# Patient Record
Sex: FEMALE | Race: WHITE | NOT HISPANIC OR LATINO | Employment: FULL TIME | URBAN - METROPOLITAN AREA
[De-identification: names, ages, dates, MRNs, and addresses within clinical notes are randomized per-mention and may not be internally consistent; named-entity substitution may affect disease eponyms.]

---

## 2022-12-14 ENCOUNTER — OFFICE VISIT (OUTPATIENT)
Dept: OBSTETRICS AND GYNECOLOGY | Facility: CLINIC | Age: 27
End: 2022-12-14
Payer: COMMERCIAL

## 2022-12-14 VITALS
DIASTOLIC BLOOD PRESSURE: 60 MMHG | WEIGHT: 128.31 LBS | HEIGHT: 67 IN | BODY MASS INDEX: 20.14 KG/M2 | SYSTOLIC BLOOD PRESSURE: 110 MMHG

## 2022-12-14 DIAGNOSIS — Z01.419 ENCOUNTER FOR ANNUAL ROUTINE GYNECOLOGICAL EXAMINATION: Primary | ICD-10-CM

## 2022-12-14 DIAGNOSIS — Z12.39 BREAST CANCER SCREENING OTHER THAN MAMMOGRAM: ICD-10-CM

## 2022-12-14 DIAGNOSIS — Z30.41 ENCOUNTER FOR SURVEILLANCE OF CONTRACEPTIVE PILLS: ICD-10-CM

## 2022-12-14 PROCEDURE — 99385 PR PREVENTIVE VISIT,NEW,18-39: ICD-10-PCS | Mod: S$GLB,,, | Performed by: OBSTETRICS & GYNECOLOGY

## 2022-12-14 PROCEDURE — 3078F PR MOST RECENT DIASTOLIC BLOOD PRESSURE < 80 MM HG: ICD-10-PCS | Mod: CPTII,S$GLB,, | Performed by: OBSTETRICS & GYNECOLOGY

## 2022-12-14 PROCEDURE — 3074F PR MOST RECENT SYSTOLIC BLOOD PRESSURE < 130 MM HG: ICD-10-PCS | Mod: CPTII,S$GLB,, | Performed by: OBSTETRICS & GYNECOLOGY

## 2022-12-14 PROCEDURE — 88142 CYTOPATH C/V THIN LAYER: CPT | Performed by: OBSTETRICS & GYNECOLOGY

## 2022-12-14 PROCEDURE — 3008F PR BODY MASS INDEX (BMI) DOCUMENTED: ICD-10-PCS | Mod: CPTII,S$GLB,, | Performed by: OBSTETRICS & GYNECOLOGY

## 2022-12-14 PROCEDURE — 3008F BODY MASS INDEX DOCD: CPT | Mod: CPTII,S$GLB,, | Performed by: OBSTETRICS & GYNECOLOGY

## 2022-12-14 PROCEDURE — 99385 PREV VISIT NEW AGE 18-39: CPT | Mod: S$GLB,,, | Performed by: OBSTETRICS & GYNECOLOGY

## 2022-12-14 PROCEDURE — 3078F DIAST BP <80 MM HG: CPT | Mod: CPTII,S$GLB,, | Performed by: OBSTETRICS & GYNECOLOGY

## 2022-12-14 PROCEDURE — 3074F SYST BP LT 130 MM HG: CPT | Mod: CPTII,S$GLB,, | Performed by: OBSTETRICS & GYNECOLOGY

## 2022-12-14 PROCEDURE — 1159F PR MEDICATION LIST DOCUMENTED IN MEDICAL RECORD: ICD-10-PCS | Mod: CPTII,S$GLB,, | Performed by: OBSTETRICS & GYNECOLOGY

## 2022-12-14 PROCEDURE — 1159F MED LIST DOCD IN RCRD: CPT | Mod: CPTII,S$GLB,, | Performed by: OBSTETRICS & GYNECOLOGY

## 2022-12-14 RX ORDER — LEVONORGESTREL / ETHINYL ESTRADIOL AND ETHINYL ESTRADIOL 150-30(84)
1 KIT ORAL DAILY
Qty: 84 EACH | Refills: 3 | Status: SHIPPED | OUTPATIENT
Start: 2022-12-14 | End: 2023-12-12

## 2022-12-14 NOTE — PROGRESS NOTES
"Chief Complaint: Well Woman Exam     HPI:      Mounika Raphael is a 27 y.o.  who presents today for well woman exam.  LMP: Patient's last menstrual period was 12/10/2022 (exact date).  No issues, problems, or complaints. Specifically, patient denies abnormal vaginal bleeding, discharge, pelvic pain, urinary problems, or changes in appetite. Ms. Raphael is currently sexually active with a single male partner. She is currently using condoms, oral contraceptives (estrogen/progesterone) for contraception. She declines STD screening today.    Previous Pap:      (No result found)  Previous Mammogram:  No results found for this or any previous visit.  Most Recent Dexa: not indicated  Colonoscopy: not indicated    COVID vaccine: completed series, no boosters  Gardasil:Completed     There is no problem list on file for this patient.      History reviewed. No pertinent past medical history.    History reviewed. No pertinent surgical history.    OB History          0    Para   0    Term   0       0    AB   0    Living   0         SAB   0    IAB   0    Ectopic   0    Multiple   0    Live Births   0           Obstetric Comments   15/R/4-5  No abnormal pap  No STDs               ROS:     Review of Systems   Constitutional:  Negative for activity change, appetite change and fatigue.   Respiratory:  Negative for shortness of breath.    Cardiovascular:  Negative for chest pain.   Gastrointestinal:  Negative for abdominal pain, constipation and diarrhea.   Endocrine: Negative for cold intolerance and heat intolerance.   Genitourinary:  Negative for dysuria, frequency, menstrual irregularity, pelvic pain and vaginal discharge.   Integumentary:  Negative for breast mass, breast discharge and breast tenderness.   Psychiatric/Behavioral:  Negative for dysphoric mood. The patient is not nervous/anxious.    Breast: Negative for mass and tenderness    Physical Exam:      PHYSICAL EXAM:  /60   Ht 5' 7" (1.702 " m)   Wt 58.2 kg (128 lb 4.9 oz)   LMP 12/10/2022 (Exact Date)   BMI 20.10 kg/m²   Body mass index is 20.1 kg/m².     APPEARANCE: Well nourished, well developed, in no acute distress.  PSYCH: Appropriate mood and affect.  SKIN: No acne or hirsutism  NECK: Neck symmetric without masses or thyromegaly  NODES: No inguinal, axillary, or supraclavicular lymph node enlargement  ABDOMEN: Soft.  No tenderness or masses.    CARDIOVASCULAR: No edema of peripheral extremities  BREASTS: Symmetrical, no skin changes or visible lesions.  No palpable masses or nipple discharge bilaterally.  PELVIC: Normal external genitalia without lesions.  Normal hair distribution.  Adequate perineal body, normal urethral meatus.  Vagina moist and well rugated without lesions or discharge.  Cervix pink, without lesions, discharge or tenderness.  No significant cystocele or rectocele.  Bimanual exam shows uterus to be normal size, regular, mobile and nontender.  Adnexa without masses or tenderness.      Assessment:     1. Encounter for annual routine gynecological examination  Liquid-Based Pap Smear, Screening      2. Encounter for surveillance of contraceptive pills  L norgest/e.estradioL-e.estrad (DAYSEE) 0.15 mg-30 mcg (84)/10 mcg (7) 3MPk      3. Breast cancer screening other than mammogram              Plan:     Clinical breast exam performed.  Pap collected.  Mammogram at 40 or as needed.  DEXA at 65 or as needed.  Colonoscopy at 45 or as needed.  Contraception: ASIM.  Follow up in about 1 year (around 12/14/2023) for annual well woman exam or as needed.    Counseling:     Patient was counseled today on current ASCCP pap guidelines, the recommendation for yearly pelvic exams, healthy diet and exercise routines, breast self awareness.She is to see her PCP for other health maintenance.     Use of the [employer] Patient Portal discussed and encouraged during today's visit.         Chelle Wall MD  [facility]  12/14/2022

## 2022-12-14 NOTE — PATIENT INSTRUCTIONS
Please check out the [employer] College of Obst[employer]ians [employer]nec[employer] PATIENT WEBSITE.  The site has education materials, patient stories, expert views, and a portal for you to ask questions.       [email or web address]      As always, please let me know if you have any questions.     Dr. Chelle Wall

## 2022-12-22 LAB
FINAL PATHOLOGIC DIAGNOSIS: NORMAL
Lab: NORMAL

## 2022-12-23 ENCOUNTER — TELEPHONE (OUTPATIENT)
Dept: OBSTETRICS AND GYNECOLOGY | Facility: CLINIC | Age: 27
End: 2022-12-23
Payer: COMMERCIAL

## 2023-06-21 ENCOUNTER — OFFICE VISIT (OUTPATIENT)
Dept: FAMILY MEDICINE | Facility: CLINIC | Age: 28
End: 2023-06-21
Attending: FAMILY MEDICINE
Payer: COMMERCIAL

## 2023-06-21 DIAGNOSIS — B96.89 BACTERIAL CONJUNCTIVITIS OF BOTH EYES: ICD-10-CM

## 2023-06-21 DIAGNOSIS — H10.9 BACTERIAL CONJUNCTIVITIS OF BOTH EYES: ICD-10-CM

## 2023-06-21 DIAGNOSIS — J02.0 ACUTE STREPTOCOCCAL PHARYNGITIS: Primary | ICD-10-CM

## 2023-06-21 PROCEDURE — 1160F RVW MEDS BY RX/DR IN RCRD: CPT | Mod: CPTII,95,, | Performed by: FAMILY MEDICINE

## 2023-06-21 PROCEDURE — 99204 PR OFFICE/OUTPT VISIT, NEW, LEVL IV, 45-59 MIN: ICD-10-PCS | Mod: 95,,, | Performed by: FAMILY MEDICINE

## 2023-06-21 PROCEDURE — 1159F PR MEDICATION LIST DOCUMENTED IN MEDICAL RECORD: ICD-10-PCS | Mod: CPTII,95,, | Performed by: FAMILY MEDICINE

## 2023-06-21 PROCEDURE — 99204 OFFICE O/P NEW MOD 45 MIN: CPT | Mod: 95,,, | Performed by: FAMILY MEDICINE

## 2023-06-21 PROCEDURE — 1160F PR REVIEW ALL MEDS BY PRESCRIBER/CLIN PHARMACIST DOCUMENTED: ICD-10-PCS | Mod: CPTII,95,, | Performed by: FAMILY MEDICINE

## 2023-06-21 PROCEDURE — 1159F MED LIST DOCD IN RCRD: CPT | Mod: CPTII,95,, | Performed by: FAMILY MEDICINE

## 2023-06-21 RX ORDER — AMOXICILLIN 875 MG/1
875 TABLET, FILM COATED ORAL EVERY 12 HOURS
Qty: 20 TABLET | Refills: 0 | Status: SHIPPED | OUTPATIENT
Start: 2023-06-21 | End: 2024-01-02 | Stop reason: ALTCHOICE

## 2023-06-21 NOTE — PROGRESS NOTES
Subjective     Patient ID: Mounika Raphael is a 28 y.o. female.    Chief Complaint: Sore Throat and Conjunctivitis    The patient location is: [place]    The chief complaint leading to consultation is: pink eye/strep throat    Visit type: audiovisual    Face to Face time with patient: 15 min  25 minutes of total time spent on the encounter, which includes face to face time and non-face to face time preparing to see the patient (eg, review of tests), Obtaining and/or reviewing separately obtained history, Documenting clinical information in the electronic or other health record, Independently interpreting results (not separately reported) and communicating results to the patient/family/caregiver, or Care coordination (not separately reported).         Each patient to whom he or she provides medical services by telemedicine is:  (1) informed of the relationship between the physician and patient and the respective role of any other health care provider with respect to management of the patient; and (2) notified that he or she may decline to receive medical services by telemedicine and may withdraw from such care at any time.    Notes: 28 yr old pleasant female with no significant medical history presents today as new patient and for telemedicine evaluation of sore throat and eye redness and discharge. She reported she has symptoms for 2 weeks. Had fever initially and had exposure to strep. She works in health care as tech. No cough or sick contacts/recent travel. Details as follows -        Sore Throat   This is a new problem. The current episode started in the past 7 days. The problem has been gradually improving. Neither side of throat is experiencing more pain than the other. The maximum temperature recorded prior to her arrival was 101 - 101.9 F. The fever has been present for 1 to 2 days. The pain is at a severity of 4/10. The pain is mild. Associated symptoms include congestion, coughing, ear pain and a  hoarse voice. Pertinent negatives include no abdominal pain, diarrhea, drooling, ear discharge, headaches, plugged ear sensation, neck pain, shortness of breath, stridor, swollen glands, trouble swallowing or vomiting. She has had no exposure to strep or mono. She has tried NSAIDs, acetaminophen and cool liquids for the symptoms. The treatment provided moderate relief.   Review of Systems   Constitutional: Negative.  Negative for activity change, diaphoresis and unexpected weight change.   HENT:  Positive for nasal congestion, ear pain, hoarse voice and sore throat. Negative for drooling, ear discharge, hearing loss, rhinorrhea, trouble swallowing and voice change.    Eyes: Negative.  Negative for pain, discharge and visual disturbance.   Respiratory:  Positive for cough. Negative for chest tightness, shortness of breath, wheezing and stridor.    Cardiovascular: Negative.  Negative for chest pain.   Gastrointestinal: Negative.  Negative for abdominal distention, abdominal pain, anal bleeding, constipation, diarrhea, nausea and vomiting.   Endocrine: Negative.  Negative for cold intolerance, polydipsia and polyuria.   Genitourinary: Negative.  Negative for decreased urine volume, difficulty urinating, dysuria, frequency, menstrual problem and vaginal pain.   Musculoskeletal: Negative.  Negative for arthralgias, gait problem, myalgias and neck pain.   Integumentary:  Negative for color change, pallor and wound. Negative.   Allergic/Immunologic: Negative.  Negative for environmental allergies and immunocompromised state.   Neurological: Negative.  Negative for dizziness, tremors, seizures, speech difficulty and headaches.   Hematological: Negative.  Negative for adenopathy. Does not bruise/bleed easily.   Psychiatric/Behavioral: Negative.  Negative for agitation, confusion, decreased concentration, hallucinations, self-injury and suicidal ideas. The patient is not nervous/anxious.         No past medical history on  file.    No past surgical history on file.    Family History   Problem Relation Age of Onset    Breast cancer Neg Hx     Ovarian cancer Neg Hx     Colon cancer Neg Hx        Social History     Socioeconomic History    Marital status: Significant Other   Tobacco Use    Smoking status: Never    Smokeless tobacco: Never   Substance and Sexual Activity    Alcohol use: Yes     Alcohol/week: 2.0 - 3.0 standard drinks     Types: 2 - 3 Standard drinks or equivalent per week    Drug use: Never    Sexual activity: Yes     Partners: Male     Birth control/protection: OCP, Condom       Current Outpatient Medications   Medication Sig Dispense Refill    amoxicillin (AMOXIL) 875 MG tablet Take 1 tablet (875 mg total) by mouth every 12 (twelve) hours. 20 tablet 0    L norgest/e.estradioL-e.estrad (DAYSEE) 0.15 mg-30 mcg (84)/10 mcg (7) 3MPk Take 1 tablet by mouth once daily. 84 each 3     No current facility-administered medications for this visit.       Review of patient's allergies indicates:  No Known Allergies    Objective   There were no vitals filed for this visit.    Physical Exam  Constitutional:       Appearance: Normal appearance. She is normal weight.   HENT:      Head: Normocephalic and atraumatic.      Mouth/Throat:      Pharynx: Oropharyngeal exudate and posterior oropharyngeal erythema present.   Eyes:      General:         Right eye: Discharge present.         Left eye: Discharge present.     Comments: exudates and erythematous conjunctiva   Pulmonary:      Effort: Pulmonary effort is normal.   Neurological:      General: No focal deficit present.      Mental Status: She is alert and oriented to person, place, and time.   Psychiatric:         Mood and Affect: Mood normal.         Behavior: Behavior normal.         Thought Content: Thought content normal.         Judgment: Judgment normal.        Assessment and Plan     1. Acute streptococcal pharyngitis  -     amoxicillin (AMOXIL) 875 MG tablet; Take 1 tablet (875  mg total) by mouth every 12 (twelve) hours.  Dispense: 20 tablet; Refill: 0    2. Bacterial conjunctivitis of both eyes  -     amoxicillin (AMOXIL) 875 MG tablet; Take 1 tablet (875 mg total) by mouth every 12 (twelve) hours.  Dispense: 20 tablet; Refill: 0        Mounika was seen today for sore throat and conjunctivitis.    Diagnoses and all orders for this visit:    Acute streptococcal pharyngitis  -     amoxicillin (AMOXIL) 875 MG tablet; Take 1 tablet (875 mg total) by mouth every 12 (twelve) hours.    Bacterial conjunctivitis of both eyes  -     amoxicillin (AMOXIL) 875 MG tablet; Take 1 tablet (875 mg total) by mouth every 12 (twelve) hours.      Acute bacterial pharyngityis and conjunctivitis  -Advised warm liquids, warm/salt water gargles, halls lozenges, avoid cold drinks/lea/ice cream  -amoxil x 10 days    Spent adequate time in obtaining history and explaining differentials    30 minutes spent during this visit of which greater than 50% devoted to face-face counseling and coordination of care regarding diagnosis and management plan    Follow up if symptoms worsen or fail to improve.           Follow up if symptoms worsen or fail to improve.

## 2023-12-12 DIAGNOSIS — Z30.41 ENCOUNTER FOR SURVEILLANCE OF CONTRACEPTIVE PILLS: ICD-10-CM

## 2023-12-12 RX ORDER — LEVONORGESTREL AND ETHINYL ESTRADIOL 150-30(84)
1 KIT ORAL
Qty: 91 EACH | Refills: 0 | Status: SHIPPED | OUTPATIENT
Start: 2023-12-12 | End: 2024-01-02 | Stop reason: SDUPTHER

## 2023-12-12 NOTE — TELEPHONE ENCOUNTER
Refill Decision Note   Mounika Raphael  is requesting a refill authorization.  Brief Assessment and Rationale for Refill:  Approve     Medication Therapy Plan:        Comments:     Note composed:11:46 AM 12/12/2023

## 2024-01-02 ENCOUNTER — OFFICE VISIT (OUTPATIENT)
Dept: OBSTETRICS AND GYNECOLOGY | Facility: CLINIC | Age: 29
End: 2024-01-02
Attending: STUDENT IN AN ORGANIZED HEALTH CARE EDUCATION/TRAINING PROGRAM
Payer: COMMERCIAL

## 2024-01-02 VITALS
DIASTOLIC BLOOD PRESSURE: 68 MMHG | BODY MASS INDEX: 19.66 KG/M2 | SYSTOLIC BLOOD PRESSURE: 106 MMHG | WEIGHT: 125.25 LBS | HEIGHT: 67 IN

## 2024-01-02 DIAGNOSIS — Z12.39 BREAST CANCER SCREENING OTHER THAN MAMMOGRAM: ICD-10-CM

## 2024-01-02 DIAGNOSIS — Z30.41 ENCOUNTER FOR SURVEILLANCE OF CONTRACEPTIVE PILLS: ICD-10-CM

## 2024-01-02 DIAGNOSIS — Z01.419 ENCOUNTER FOR ANNUAL ROUTINE GYNECOLOGICAL EXAMINATION: Primary | ICD-10-CM

## 2024-01-02 PROCEDURE — 3078F DIAST BP <80 MM HG: CPT | Mod: CPTII,S$GLB,, | Performed by: OBSTETRICS & GYNECOLOGY

## 2024-01-02 PROCEDURE — 99395 PREV VISIT EST AGE 18-39: CPT | Mod: S$GLB,,, | Performed by: OBSTETRICS & GYNECOLOGY

## 2024-01-02 PROCEDURE — 3008F BODY MASS INDEX DOCD: CPT | Mod: CPTII,S$GLB,, | Performed by: OBSTETRICS & GYNECOLOGY

## 2024-01-02 PROCEDURE — 3074F SYST BP LT 130 MM HG: CPT | Mod: CPTII,S$GLB,, | Performed by: OBSTETRICS & GYNECOLOGY

## 2024-01-02 PROCEDURE — 1159F MED LIST DOCD IN RCRD: CPT | Mod: CPTII,S$GLB,, | Performed by: OBSTETRICS & GYNECOLOGY

## 2024-01-02 RX ORDER — LEVONORGESTREL / ETHINYL ESTRADIOL AND ETHINYL ESTRADIOL 150-30(84)
1 KIT ORAL DAILY
Qty: 91 EACH | Refills: 3 | Status: SHIPPED | OUTPATIENT
Start: 2024-01-02

## 2024-01-02 NOTE — PROGRESS NOTES
"Chief Complaint: Well Woman Exam     HPI:      Mounika Raphael is a 28 y.o.  who presents today for well woman exam.  LMP: Patient's last menstrual period was 12/15/2023 (exact date).  No issues, problems, or complaints. Specifically, patient denies abnormal vaginal bleeding, discharge, pelvic pain, urinary problems, or changes in appetite. Ms. Raphael is currently sexually active with a single male partner. She is currently using oral contraceptives (estrogen/progesterone) for contraception. She declines STD screening today.    Previous Pap:  no abnormalities (2022)  Previous Mammogram: No results found for this or any previous visit.  Most Recent Dexa: not indicated  Colonoscopy: never had    COVID vaccine: completed series  Gardasil:Completed     There is no problem list on file for this patient.      History reviewed. No pertinent past medical history.    History reviewed. No pertinent surgical history.    OB History          0    Para   0    Term   0       0    AB   0    Living   0         SAB   0    IAB   0    Ectopic   0    Multiple   0    Live Births   0           Obstetric Comments   15/R/4-5  No abnormal pap  No STDs               ROS:     Review of Systems   Constitutional:  Negative for activity change, appetite change and fatigue.   Respiratory:  Negative for shortness of breath.    Cardiovascular:  Negative for chest pain.   Gastrointestinal:  Negative for abdominal pain, constipation and diarrhea.   Endocrine: Negative for cold intolerance and heat intolerance.   Genitourinary:  Negative for dysuria, frequency, menstrual irregularity, pelvic pain and vaginal discharge.   Integumentary:  Negative for breast mass, breast discharge and breast tenderness.   Psychiatric/Behavioral:  Negative for dysphoric mood. The patient is not nervous/anxious.    Breast: Negative for mass and tenderness      Physical Exam:      PHYSICAL EXAM:  /68   Ht 5' 7" (1.702 m)   Wt 56.8 kg " (125 lb 3.5 oz)   LMP 12/15/2023 (Exact Date)   BMI 19.61 kg/m²   Body mass index is 19.61 kg/m².     APPEARANCE: Well nourished, well developed, in no acute distress.  PSYCH: Appropriate mood and affect.  SKIN: No acne or hirsutism  NECK: Neck symmetric without masses or thyromegaly  NODES: No inguinal, axillary, or supraclavicular lymph node enlargement  ABDOMEN: Soft.  No tenderness or masses.    CARDIOVASCULAR: No edema of peripheral extremities  BREASTS: Symmetrical, no skin changes or visible lesions.  No palpable masses or nipple discharge bilaterally.  PELVIC: Normal external genitalia without lesions.  Normal hair distribution.  Adequate perineal body, normal urethral meatus.  Vagina moist and well rugated without lesions or discharge.  Cervix pink, without lesions, discharge or tenderness.  No significant cystocele or rectocele.  Bimanual exam shows uterus to be normal size, regular, mobile and nontender.  Adnexa without masses or tenderness.      Assessment:     1. Encounter for annual routine gynecological examination        2. Breast cancer screening other than mammogram        3. Encounter for surveillance of contraceptive pills  L norgest/e.estradioL-e.estrad (DAYSEE) 0.15 mg-30 mcg (84)/10 mcg (7) 3MPk            Plan:     Clinical breast exam performed.  Pap UTD.  Mammogram at 40 or as needed.  DEXA at 65.  Colonoscopy at 45.  Contraception: continue ASIM.  Follow up in about 1 year (around 1/2/2025) for annual well woman exam or as needed.    Counseling:     Patient was counseled today on current ASCCP pap guidelines, the recommendation for yearly pelvic exams, healthy diet and exercise routines, breast self awareness.She is to see her PCP for other health maintenance.     Use of the [employer] Patient Portal discussed and encouraged during today's visit.         Chelle Wall MD  [facility]  01/02/2024

## 2025-01-08 ENCOUNTER — ON-DEMAND VIRTUAL (OUTPATIENT)
Dept: URGENT CARE | Facility: CLINIC | Age: 30
End: 2025-01-08
Payer: COMMERCIAL

## 2025-01-08 DIAGNOSIS — R39.9 UTI SYMPTOMS: Primary | ICD-10-CM

## 2025-01-08 RX ORDER — NITROFURANTOIN 25; 75 MG/1; MG/1
100 CAPSULE ORAL 2 TIMES DAILY
Qty: 14 CAPSULE | Refills: 0 | Status: SHIPPED | OUTPATIENT
Start: 2025-01-08 | End: 2025-01-15

## 2025-01-08 RX ORDER — PHENAZOPYRIDINE HYDROCHLORIDE 200 MG/1
200 TABLET, FILM COATED ORAL 3 TIMES DAILY PRN
Qty: 30 TABLET | Refills: 0 | Status: SHIPPED | OUTPATIENT
Start: 2025-01-08 | End: 2025-01-18

## 2025-01-09 NOTE — PROGRESS NOTES
Subjective:      Patient ID: Mounika Raphael is a 29 y.o. female.    Vitals:  vitals were not taken for this visit.     Chief Complaint: Urinary Tract Infection      Visit Type: TELE AUDIOVISUAL    Present with the patient at the time of consultation: TELEMED PRESENT WITH PATIENT: None    Patient Location: Home     History reviewed. No pertinent past medical history.  History reviewed. No pertinent surgical history.  Review of patient's allergies indicates:  No Known Allergies  Current Outpatient Medications on File Prior to Visit   Medication Sig Dispense Refill    L norgest/e.estradioL-e.estrad (DAYSEE) 0.15 mg-30 mcg (84)/10 mcg (7) 3MPk Take 1 tablet by mouth once daily. 91 each 3     No current facility-administered medications on file prior to visit.     Family History   Problem Relation Name Age of Onset    Breast cancer Neg Hx      Ovarian cancer Neg Hx      Colon cancer Neg Hx         Medications Ordered                [id number] - [place], [place] - [address] [facility]   [address] [facility] [id number]    Telephone: [phone]   Fax: [phone]   Hours: Not open 24 hours                         Unspecified Transmission Method (2 of 2)              nitrofurantoin, macrocrystal-monohydrate, (MACROBID) 100 MG capsule    Sig: Take 1 capsule (100 mg total) by mouth 2 (two) times daily. for 7 days       Start: 1/8/25     Quantity: 14 capsule Refills: 0                         phenazopyridine (PYRIDIUM) 200 MG tablet    Sig: Take 1 tablet (200 mg total) by mouth 3 (three) times daily as needed for Pain.       Start: 1/8/25     Quantity: 30 tablet Refills: 0                           Ohs Peq Odvv Intake    1/8/2025  7:23 PM CST - Filed by Patient   What is your current physical address in the event of a medical emergency? [address] [place] [address]   Are you able to take your vital signs? Yes   Systolic Blood Pressure:    Diastolic Blood Pressure:     Weight:    Height:    Pulse:    Temperature:    Respiration rate:    Pulse Oxygen:    Please attach any relevant images or files    Is your employer contracted with [facility]? No         30 y/o female with c/o dysuria with urinary frequency and urgency. Denies hematuria, flank pain or pelvic pain.         Constitution: Negative for chills and fever.   Genitourinary:  Positive for dysuria, frequency and urgency. Negative for flank pain, hematuria and pelvic pain.        Objective:   The physical exam was conducted virtually.  Physical Exam   Constitutional: She is oriented to person, place, and time. She is cooperative. She does not appear ill. No distress. awake  HENT:   Head: Normocephalic.   Abdominal: Normal appearance.   Neurological: She is alert and oriented to person, place, and time.   Psychiatric: Her behavior is normal. Judgment normal.       Assessment:     1. UTI symptoms        Plan:     UTI symptoms  -     nitrofurantoin, macrocrystal-monohydrate, (MACROBID) 100 MG capsule; Take 1 capsule (100 mg total) by mouth 2 (two) times daily. for 7 days  Dispense: 14 capsule; Refill: 0  -     phenazopyridine (PYRIDIUM) 200 MG tablet; Take 1 tablet (200 mg total) by mouth 3 (three) times daily as needed for Pain.  Dispense: 30 tablet; Refill: 0      Follow-up with Primary Care as discussed for further refills.     Patient encouraged to monitor symptoms closely and instructed to follow-up for new or worsening symptoms. Further, in-person, evaluation may be necessary for continued treatment. Please follow up with your primary care doctor or specialist as needed. Verbally discussed plan. Patient confirms understanding and is in agreement with treatment and plan.      You must understand that you've received a Virtual Care evaluation only and that you may be released before all your medical problems are known or treated. You, the patient, will arrange for follow up care as instructed.

## 2025-03-05 DIAGNOSIS — Z30.41 ENCOUNTER FOR SURVEILLANCE OF CONTRACEPTIVE PILLS: ICD-10-CM

## 2025-03-05 RX ORDER — LEVONORGESTREL AND ETHINYL ESTRADIOL 150-30(84)
1 KIT ORAL
Qty: 91 TABLET | Refills: 0 | Status: SHIPPED | OUTPATIENT
Start: 2025-03-05

## 2025-03-05 NOTE — TELEPHONE ENCOUNTER
Refill Decision Note   Mounika Raphael  is requesting a refill authorization.  Brief Assessment and Rationale for Refill:  Approve     Medication Therapy Plan:         Comments:     Note composed:9:14 AM 03/05/2025

## 2025-03-25 ENCOUNTER — OFFICE VISIT (OUTPATIENT)
Dept: OBSTETRICS AND GYNECOLOGY | Facility: CLINIC | Age: 30
End: 2025-03-25
Payer: COMMERCIAL

## 2025-03-25 VITALS
BODY MASS INDEX: 19.93 KG/M2 | SYSTOLIC BLOOD PRESSURE: 106 MMHG | WEIGHT: 127 LBS | HEIGHT: 67 IN | DIASTOLIC BLOOD PRESSURE: 64 MMHG

## 2025-03-25 DIAGNOSIS — Z01.419 ENCOUNTER FOR ANNUAL ROUTINE GYNECOLOGICAL EXAMINATION: Primary | ICD-10-CM

## 2025-03-25 DIAGNOSIS — Z31.69 ENCOUNTER FOR PRECONCEPTION CONSULTATION: ICD-10-CM

## 2025-03-25 DIAGNOSIS — Z12.4 CERVICAL CANCER SCREENING: ICD-10-CM

## 2025-03-25 DIAGNOSIS — Z12.39 BREAST CANCER SCREENING OTHER THAN MAMMOGRAM: ICD-10-CM

## 2025-03-25 PROCEDURE — 1159F MED LIST DOCD IN RCRD: CPT | Mod: CPTII,S$GLB,, | Performed by: OBSTETRICS & GYNECOLOGY

## 2025-03-25 PROCEDURE — 99999 PR PBB SHADOW E&M-EST. PATIENT-LVL III: CPT | Mod: PBBFAC,,, | Performed by: OBSTETRICS & GYNECOLOGY

## 2025-03-25 PROCEDURE — 3008F BODY MASS INDEX DOCD: CPT | Mod: CPTII,S$GLB,, | Performed by: OBSTETRICS & GYNECOLOGY

## 2025-03-25 PROCEDURE — 99395 PREV VISIT EST AGE 18-39: CPT | Mod: S$GLB,,, | Performed by: OBSTETRICS & GYNECOLOGY

## 2025-03-25 PROCEDURE — 3074F SYST BP LT 130 MM HG: CPT | Mod: CPTII,S$GLB,, | Performed by: OBSTETRICS & GYNECOLOGY

## 2025-03-25 PROCEDURE — 3078F DIAST BP <80 MM HG: CPT | Mod: CPTII,S$GLB,, | Performed by: OBSTETRICS & GYNECOLOGY

## 2025-03-25 PROCEDURE — 1160F RVW MEDS BY RX/DR IN RCRD: CPT | Mod: CPTII,S$GLB,, | Performed by: OBSTETRICS & GYNECOLOGY

## 2025-03-25 NOTE — PROGRESS NOTES
"  Chief Complaint: Well Woman Exam     HPI:      Mounika Raphael is a 30 y.o.  who presents for annual exam. She is currently complaining of  menses that occur every 4-5 weeks since discontinuing ASIM 6 months ago .    Ms. Raphael is currently sexually active with a single male partner. She declines STD screening today. Patient does not have regular monthly menses. Patient's last menstrual period was 2025 (exact date). She is currently using condoms for contraception. Is open to pregnancy at this time.  Is not taking a PNV.    Previous Pap:  no abnormalities (2022)  Previous Mammogram:  No results found for this or any previous visit.  Most Recent Dexa: not indicated  Colonoscopy: never had    COVID vaccine: completed series  Gardasil:Completed     Problem List[1]    Past Medical History:   Diagnosis Date    Chlamydia 2020    treated by antibiotics       History reviewed. No pertinent surgical history.    OB History          0    Para   0    Term   0       0    AB   0    Living   0         SAB   0    IAB   0    Ectopic   0    Multiple   0    Live Births   0           Obstetric Comments   15/R/4-5  No abnormal pap  No STDs               ROS:     Review of Systems   Constitutional:  Negative for activity change, appetite change and fatigue.   Respiratory:  Negative for shortness of breath.    Cardiovascular:  Negative for chest pain.   Gastrointestinal:  Negative for abdominal pain, constipation and diarrhea.   Endocrine: Negative for cold intolerance and heat intolerance.   Genitourinary:  Negative for dysuria, frequency, menstrual irregularity, pelvic pain and vaginal discharge.   Integumentary:  Negative for breast mass, breast discharge and breast tenderness.   Psychiatric/Behavioral:  Negative for dysphoric mood. The patient is not nervous/anxious.    Breast: Negative for mass and tenderness      Physical Exam:      PHYSICAL EXAM:  /64   Ht 5' 7" (1.702 m)   Wt 57.6 kg " (126 lb 15.8 oz)   LMP 02/25/2025 (Exact Date)   BMI 19.89 kg/m²   Body mass index is 19.89 kg/m².     APPEARANCE: Well nourished, well developed, in no acute distress.  PSYCH: Appropriate mood and affect.  SKIN: No acne or hirsutism  NECK: Neck symmetric without masses or thyromegaly  NODES: No inguinal, axillary, or supraclavicular lymph node enlargement  CHEST: Normal respiratory effort.  ABDOMEN: Soft.  No tenderness or masses.   BREASTS: Symmetrical, no skin changes or visible lesions.  No palpable masses or nipple discharge bilaterally.  PELVIC: Normal external genitalia without lesions.  Normal hair distribution.  Adequate perineal body, normal urethral meatus.  Vagina moist and well rugated without lesions or discharge.  Cervix pink, without lesions, discharge or tenderness.  No significant cystocele or rectocele.  Bimanual exam shows uterus to be normal size, regular, mobile and nontender.  Adnexa without masses or tenderness.    EXTREMITIES: No edema.    Assessment:     1. Encounter for annual routine gynecological examination        2. Cervical cancer screening  Liquid-Based Pap Smear, Screening      3. Breast cancer screening other than mammogram        4. Encounter for preconception consultation  CBC Auto Differential    Comprehensive Metabolic Panel    Hemoglobin A1C    Rubella Antibody, IgG    TSH            Plan:     Clinical breast exam performed.  Pap collected.  Mammogram at 40 or as needed.  DEXA at 65.  Colonoscopy at 45 or as needed.  Contraception: declined.  Follow up in about 1 year (around 3/25/2026) for annual well woman exam or as needed.    Counseling:     Patient was counseled today on current ASCCP pap guidelines, the recommendation for yearly pelvic exams, healthy diet and exercise routines, breast self awareness and annual mammograms. She is to see her PCP for other health maintenance.     Pre-Conception Counseling  Patient was counseled today on proper weight gain during pregnancy  based on the [employer] [place] [facility]'s recommendations. Healthy diet emphasized.   Recommended prenatal vitamins with folic acid starting at least 3 months prior to conception.  Safety of exercise discussed with patient, and continued active lifestyle encouraged.   Zika virus precautions for both patient and her spouse reviewed, and patient was advised to let us know if she has any flu like sx in the 6 months prior to conception.  Avoidance of tobacco and alcohol when trying to conceive were discussed.   Discussed carrier screening and patient will call if interested.    Reviewed medical history and immunization history.    Use of the [employer] Patient Portal discussed and encouraged during today's visit.         Chelle Wall MD  [facility]  03/25/2025         [1] There is no problem list on file for this patient.      No

## 2025-04-02 ENCOUNTER — LAB VISIT (OUTPATIENT)
Dept: LAB | Facility: HOSPITAL | Age: 30
End: 2025-04-02
Attending: OBSTETRICS & GYNECOLOGY
Payer: COMMERCIAL

## 2025-04-02 DIAGNOSIS — Z31.69 ENCOUNTER FOR PRECONCEPTION CONSULTATION: ICD-10-CM

## 2025-04-02 LAB
ABSOLUTE EOSINOPHIL (OHS): 0.32 K/UL
ABSOLUTE MONOCYTE (OHS): 0.37 K/UL (ref 0.3–1)
ABSOLUTE NEUTROPHIL COUNT (OHS): 2.9 K/UL (ref 1.8–7.7)
ALBUMIN SERPL BCP-MCNC: 4.2 G/DL (ref 3.5–5.2)
ALP SERPL-CCNC: 83 UNIT/L (ref 40–150)
ALT SERPL W/O P-5'-P-CCNC: 19 UNIT/L (ref 10–44)
ANION GAP (OHS): 10 MMOL/L (ref 8–16)
AST SERPL-CCNC: 19 UNIT/L (ref 11–45)
BASOPHILS # BLD AUTO: 0.08 K/UL
BASOPHILS NFR BLD AUTO: 1.4 %
BILIRUB SERPL-MCNC: 0.3 MG/DL (ref 0.1–1)
BUN SERPL-MCNC: 16 MG/DL (ref 6–20)
CALCIUM SERPL-MCNC: 10.1 MG/DL (ref 8.7–10.5)
CHLORIDE SERPL-SCNC: 108 MMOL/L (ref 95–110)
CO2 SERPL-SCNC: 22 MMOL/L (ref 23–29)
CREAT SERPL-MCNC: 0.8 MG/DL (ref 0.5–1.4)
ERYTHROCYTE [DISTWIDTH] IN BLOOD BY AUTOMATED COUNT: 13 % (ref 11.5–14.5)
GFR SERPLBLD CREATININE-BSD FMLA CKD-EPI: >60 ML/MIN/1.73/M2
GLUCOSE SERPL-MCNC: 89 MG/DL (ref 70–110)
HCT VFR BLD AUTO: 47.1 % (ref 37–48.5)
HGB BLD-MCNC: 14.9 GM/DL (ref 12–16)
IMM GRANULOCYTES # BLD AUTO: 0.03 K/UL (ref 0–0.04)
IMM GRANULOCYTES NFR BLD AUTO: 0.5 % (ref 0–0.5)
LYMPHOCYTES # BLD AUTO: 1.83 K/UL (ref 1–4.8)
MCH RBC QN AUTO: 31.8 PG (ref 27–31)
MCHC RBC AUTO-ENTMCNC: 31.6 G/DL (ref 32–36)
MCV RBC AUTO: 101 FL (ref 82–98)
NUCLEATED RBC (/100WBC) (OHS): 0 /100 WBC
PLATELET # BLD AUTO: 364 K/UL (ref 150–450)
PMV BLD AUTO: 10.4 FL (ref 9.2–12.9)
POTASSIUM SERPL-SCNC: 3.9 MMOL/L (ref 3.5–5.1)
PROT SERPL-MCNC: 7.6 GM/DL (ref 6–8.4)
RBC # BLD AUTO: 4.68 M/UL (ref 4–5.4)
RELATIVE EOSINOPHIL (OHS): 5.8 %
RELATIVE LYMPHOCYTE (OHS): 33.1 % (ref 18–48)
RELATIVE MONOCYTE (OHS): 6.7 % (ref 4–15)
RELATIVE NEUTROPHIL (OHS): 52.5 % (ref 38–73)
SODIUM SERPL-SCNC: 140 MMOL/L (ref 136–145)
TSH SERPL-ACNC: 1.94 UIU/ML (ref 0.4–4)
WBC # BLD AUTO: 5.53 K/UL (ref 3.9–12.7)

## 2025-04-02 PROCEDURE — 36415 COLL VENOUS BLD VENIPUNCTURE: CPT | Mod: PO

## 2025-04-02 PROCEDURE — 86762 RUBELLA ANTIBODY: CPT

## 2025-04-02 PROCEDURE — 80053 COMPREHEN METABOLIC PANEL: CPT

## 2025-04-02 PROCEDURE — 85025 COMPLETE CBC W/AUTO DIFF WBC: CPT

## 2025-04-02 PROCEDURE — 84443 ASSAY THYROID STIM HORMONE: CPT

## 2025-04-02 PROCEDURE — 83036 HEMOGLOBIN GLYCOSYLATED A1C: CPT

## 2025-04-03 ENCOUNTER — RESULTS FOLLOW-UP (OUTPATIENT)
Dept: OBSTETRICS AND GYNECOLOGY | Facility: HOSPITAL | Age: 30
End: 2025-04-03

## 2025-04-03 DIAGNOSIS — R73.09 ELEVATED HEMOGLOBIN A1C MEASUREMENT: Primary | ICD-10-CM

## 2025-04-03 LAB
EAG (OHS): 151 MG/DL (ref 68–131)
HBA1C MFR BLD: 6.9 % (ref 4–5.6)
RUBV IGG SER-ACNC: 46.8 IU/ML
RUBV IGG SER-IMP: REACTIVE

## 2025-04-12 ENCOUNTER — LAB VISIT (OUTPATIENT)
Dept: LAB | Facility: HOSPITAL | Age: 30
End: 2025-04-12
Attending: OBSTETRICS & GYNECOLOGY
Payer: COMMERCIAL

## 2025-04-12 DIAGNOSIS — R73.09 ELEVATED HEMOGLOBIN A1C MEASUREMENT: ICD-10-CM

## 2025-04-12 LAB
GLUCOSE P FAST SERPL-MCNC: 93 MG/DL (ref 70–110)
GLUCOSE SERPL-MCNC: 128 MG/DL (ref 50–450)
GLUCOSE SERPL-MCNC: 160 MG/DL (ref 50–450)

## 2025-04-12 PROCEDURE — 36415 COLL VENOUS BLD VENIPUNCTURE: CPT | Mod: PO

## 2025-04-12 PROCEDURE — 82950 GLUCOSE TEST: CPT | Mod: 59

## 2025-04-12 PROCEDURE — 82951 GLUCOSE TOLERANCE TEST (GTT): CPT

## 2025-04-23 ENCOUNTER — RESULTS FOLLOW-UP (OUTPATIENT)
Dept: OBSTETRICS AND GYNECOLOGY | Facility: CLINIC | Age: 30
End: 2025-04-23

## 2025-07-10 ENCOUNTER — PATIENT OUTREACH (OUTPATIENT)
Dept: ADMINISTRATIVE | Facility: HOSPITAL | Age: 30
End: 2025-07-10